# Patient Record
Sex: MALE | Race: WHITE | NOT HISPANIC OR LATINO | Employment: FULL TIME | URBAN - METROPOLITAN AREA
[De-identification: names, ages, dates, MRNs, and addresses within clinical notes are randomized per-mention and may not be internally consistent; named-entity substitution may affect disease eponyms.]

---

## 2021-05-25 ENCOUNTER — RECORDS - HEALTHEAST (OUTPATIENT)
Dept: ADMINISTRATIVE | Facility: CLINIC | Age: 61
End: 2021-05-25

## 2022-09-07 ENCOUNTER — APPOINTMENT (OUTPATIENT)
Dept: GENERAL RADIOLOGY | Facility: CLINIC | Age: 62
End: 2022-09-07
Attending: EMERGENCY MEDICINE
Payer: OTHER MISCELLANEOUS

## 2022-09-07 ENCOUNTER — HOSPITAL ENCOUNTER (EMERGENCY)
Facility: CLINIC | Age: 62
Discharge: HOME | End: 2022-09-07
Attending: EMERGENCY MEDICINE | Admitting: EMERGENCY MEDICINE
Payer: OTHER MISCELLANEOUS

## 2022-09-07 VITALS
TEMPERATURE: 98 F | OXYGEN SATURATION: 96 % | RESPIRATION RATE: 12 BRPM | DIASTOLIC BLOOD PRESSURE: 64 MMHG | SYSTOLIC BLOOD PRESSURE: 117 MMHG

## 2022-09-07 DIAGNOSIS — T67.5XXA HEAT EXHAUSTION, INITIAL ENCOUNTER: ICD-10-CM

## 2022-09-07 DIAGNOSIS — R06.02 SOB (SHORTNESS OF BREATH): ICD-10-CM

## 2022-09-07 LAB
ANION GAP SERPL CALCULATED.3IONS-SCNC: 5 MMOL/L (ref 3–14)
ATRIAL RATE - MUSE: 63 BPM
BASOPHILS # BLD AUTO: 0 10E3/UL (ref 0–0.2)
BASOPHILS NFR BLD AUTO: 0 %
BUN SERPL-MCNC: 30 MG/DL (ref 7–30)
CALCIUM SERPL-MCNC: 8.1 MG/DL (ref 8.5–10.1)
CHLORIDE BLD-SCNC: 112 MMOL/L (ref 94–109)
CO2 SERPL-SCNC: 24 MMOL/L (ref 20–32)
CREAT SERPL-MCNC: 0.96 MG/DL (ref 0.66–1.25)
D DIMER PPP FEU-MCNC: <0.27 UG/ML FEU (ref 0–0.5)
DIASTOLIC BLOOD PRESSURE - MUSE: NORMAL MMHG
EOSINOPHIL # BLD AUTO: 0.1 10E3/UL (ref 0–0.7)
EOSINOPHIL NFR BLD AUTO: 1 %
ERYTHROCYTE [DISTWIDTH] IN BLOOD BY AUTOMATED COUNT: 13.7 % (ref 10–15)
FLUAV RNA SPEC QL NAA+PROBE: NEGATIVE
FLUBV RNA RESP QL NAA+PROBE: NEGATIVE
GFR SERPL CREATININE-BSD FRML MDRD: 90 ML/MIN/1.73M2
GLUCOSE BLD-MCNC: 103 MG/DL (ref 70–99)
HCT VFR BLD AUTO: 36.8 % (ref 40–53)
HGB BLD-MCNC: 11.8 G/DL (ref 13.3–17.7)
IMM GRANULOCYTES # BLD: 0 10E3/UL
IMM GRANULOCYTES NFR BLD: 0 %
INTERPRETATION ECG - MUSE: NORMAL
LYMPHOCYTES # BLD AUTO: 1.4 10E3/UL (ref 0.8–5.3)
LYMPHOCYTES NFR BLD AUTO: 19 %
MCH RBC QN AUTO: 27.2 PG (ref 26.5–33)
MCHC RBC AUTO-ENTMCNC: 32.1 G/DL (ref 31.5–36.5)
MCV RBC AUTO: 85 FL (ref 78–100)
MONOCYTES # BLD AUTO: 0.7 10E3/UL (ref 0–1.3)
MONOCYTES NFR BLD AUTO: 10 %
NEUTROPHILS # BLD AUTO: 5 10E3/UL (ref 1.6–8.3)
NEUTROPHILS NFR BLD AUTO: 70 %
NRBC # BLD AUTO: 0 10E3/UL
NRBC BLD AUTO-RTO: 0 /100
P AXIS - MUSE: 33 DEGREES
PLATELET # BLD AUTO: 271 10E3/UL (ref 150–450)
POTASSIUM BLD-SCNC: 3.8 MMOL/L (ref 3.4–5.3)
PR INTERVAL - MUSE: 178 MS
QRS DURATION - MUSE: 104 MS
QT - MUSE: 458 MS
QTC - MUSE: 468 MS
R AXIS - MUSE: -23 DEGREES
RBC # BLD AUTO: 4.34 10E6/UL (ref 4.4–5.9)
RSV RNA SPEC NAA+PROBE: NEGATIVE
SARS-COV-2 RNA RESP QL NAA+PROBE: NEGATIVE
SODIUM SERPL-SCNC: 141 MMOL/L (ref 133–144)
SYSTOLIC BLOOD PRESSURE - MUSE: NORMAL MMHG
T AXIS - MUSE: 13 DEGREES
TROPONIN I SERPL HS-MCNC: 5 NG/L
VENTRICULAR RATE- MUSE: 63 BPM
WBC # BLD AUTO: 7.1 10E3/UL (ref 4–11)

## 2022-09-07 PROCEDURE — 71046 X-RAY EXAM CHEST 2 VIEWS: CPT

## 2022-09-07 PROCEDURE — C9803 HOPD COVID-19 SPEC COLLECT: HCPCS

## 2022-09-07 PROCEDURE — 80048 BASIC METABOLIC PNL TOTAL CA: CPT | Performed by: EMERGENCY MEDICINE

## 2022-09-07 PROCEDURE — 36415 COLL VENOUS BLD VENIPUNCTURE: CPT | Performed by: EMERGENCY MEDICINE

## 2022-09-07 PROCEDURE — 87637 SARSCOV2&INF A&B&RSV AMP PRB: CPT | Performed by: EMERGENCY MEDICINE

## 2022-09-07 PROCEDURE — 93005 ELECTROCARDIOGRAM TRACING: CPT

## 2022-09-07 PROCEDURE — 258N000003 HC RX IP 258 OP 636: Performed by: EMERGENCY MEDICINE

## 2022-09-07 PROCEDURE — 99285 EMERGENCY DEPT VISIT HI MDM: CPT | Mod: CS,25

## 2022-09-07 PROCEDURE — 84484 ASSAY OF TROPONIN QUANT: CPT | Performed by: EMERGENCY MEDICINE

## 2022-09-07 PROCEDURE — 85025 COMPLETE CBC W/AUTO DIFF WBC: CPT | Performed by: EMERGENCY MEDICINE

## 2022-09-07 PROCEDURE — 85379 FIBRIN DEGRADATION QUANT: CPT | Performed by: EMERGENCY MEDICINE

## 2022-09-07 RX ADMIN — SODIUM CHLORIDE 1000 ML: 9 INJECTION, SOLUTION INTRAVENOUS at 15:17

## 2022-09-07 ASSESSMENT — ENCOUNTER SYMPTOMS
DIZZINESS: 0
LIGHT-HEADEDNESS: 0
BACK PAIN: 0
SHORTNESS OF BREATH: 1
FEVER: 0
ABDOMINAL PAIN: 0
COUGH: 1
BLOOD IN STOOL: 0

## 2022-09-07 ASSESSMENT — ACTIVITIES OF DAILY LIVING (ADL): ADLS_ACUITY_SCORE: 35

## 2022-09-07 NOTE — ED TRIAGE NOTES
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 18 gauge in left arm.  NO other meds by EMS, NP gave 325 of ASA just in case, patient declined any CP, did stated that her felt weak and unable to take a deep breath at one point of time,  VSS here in the ER.  Running a liter.  (liter bag from EMS was very warm)     Triage Assessment     Row Name 09/07/22 1321       Triage Assessment (Adult)    Airway WDL WDL       Respiratory WDL    Respiratory WDL WDL       Skin Circulation/Temperature WDL    Skin Circulation/Temperature WDL WDL       Cardiac WDL    Cardiac WDL WDL       Peripheral/Neurovascular WDL    Peripheral Neurovascular WDL WDL       Cognitive/Neuro/Behavioral WDL    Cognitive/Neuro/Behavioral WDL WDL

## 2022-09-07 NOTE — ED PROVIDER NOTES
History   Chief Complaint:  Shortness of breath     The history is provided by the patient.      Felton Leong is a 61 year old male with a history of sleep apnea who presents via EMS with shortness of breath. 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 He states that the shortness of breath has been persistent since. He denies breathing in any chemicals stating that he was wearing a respirator. He endorses a cough for approximately the past two weeks that is now resolving. He denies any chest pain, abdominal pain, back pain, fevers, blood in his stool, swelling in his legs, lightheadedness, dizziness, or feeling near syncope. He has not been around anyone sick. No history of blood clots or anemia. He denies any significant health issues or taking any daily medications. He states that he used to smoke tobacco, but he quit 20 years ago. He lives alone and does not normally wear oxygen.    Review of Systems   Constitutional: Negative for fever.   Respiratory: Positive for cough and shortness of breath.    Cardiovascular: Negative for chest pain and leg swelling.   Gastrointestinal: Negative for abdominal pain and blood in stool.   Musculoskeletal: Negative for back pain.   Neurological: Negative for dizziness and light-headedness.   All other systems reviewed and are negative.    Allergies:  The patient has no known allergies.     Medications:  The patient is currently on no regular medications.    Past Medical History:     Sleep apnea    Social History:  The patient presents to the ED unaccompanied  Presents via EMS  Living Situation: lives alone   Tobacco Use: former smoker (quit 20 years ago)  Occupation: battery recycling      Physical Exam     Patient Vitals for the past 24 hrs:   BP Temp Temp src Resp SpO2   09/07/22 1400 -- -- -- -- 96 %   09/07/22 1324 117/64 98  F (36.7  C) Oral  12 94 %       Physical Exam  General: Alert and cooperative with exam. Patient in mild distress. Overweight. Normal mentation.  Head:  Scalp is NC/AT  Eyes:  No scleral icterus, PERRL  ENT:  The external nose and ears are normal. The oropharynx is normal and without erythema; mucus membranes are moist. Uvula midline, no evidence of deep space infection.  Neck:  Normal range of motion without rigidity.  CV:  Regular rate and rhythm    No pathologic murmur   Resp:  Breath sounds are clear bilaterally    Non-labored, no retractions or accessory muscle use  GI:  Abdomen is soft, no distension, no tenderness. No peritoneal signs  MS:  No lower extremity edema   Skin:  Warm and dry, No rash or lesions noted.  Neuro: Oriented x 3. No gross motor deficits.    Emergency Department Course   ECG:  ECG taken at 1449, ECG read at 1545  Sinus rhythm with premature atrial complexes. Otherwise normal ECG.   No prior ECG for comparison.  Rate 63 bpm. SC interval 178. QRS duration 104. QT/QTc 458/468. P-R-T axes 33 -23 13.    Imaging:  Chest XR,  PA & LAT   Final Result   IMPRESSION: There are no acute infiltrates. The cardiac silhouette is   not enlarged. Pulmonary vasculature is unremarkable.      RINA DARBY MD            SYSTEM ID:  [id number]        Report per radiology    Laboratory:  Labs Ordered and Resulted from Time of ED Arrival to Time of ED Departure   BASIC METABOLIC PANEL - Abnormal       Result Value    Sodium 141      Potassium 3.8      Chloride 112 (*)     Carbon Dioxide (CO2) 24      Anion Gap 5      Urea Nitrogen 30      Creatinine 0.96      Calcium 8.1 (*)     Glucose 103 (*)     GFR Estimate 90     CBC WITH PLATELETS AND DIFFERENTIAL - Abnormal    WBC Count 7.1      RBC Count 4.34 (*)     Hemoglobin 11.8 (*)     Hematocrit 36.8 (*)     MCV 85      MCH 27.2      MCHC 32.1      RDW 13.7      Platelet Count 271      % Neutrophils 70      % Lymphocytes 19      % Monocytes 10      % Eosinophils 1      % Basophils 0       % Immature Granulocytes 0      NRBCs per 100 WBC 0      Absolute Neutrophils 5.0      Absolute Lymphocytes 1.4      Absolute Monocytes 0.7      Absolute Eosinophils 0.1      Absolute Basophils 0.0      Absolute Immature Granulocytes 0.0      Absolute NRBCs 0.0     D DIMER QUANTITATIVE - Normal    D-Dimer Quantitative <0.27     TROPONIN I - Normal    Troponin I High Sensitivity 5     INFLUENZA A/B & SARS-COV2 PCR MULTIPLEX - Normal    Influenza A PCR Negative      Influenza B PCR Negative      RSV PCR Negative      SARS CoV2 PCR Negative          Emergency Department Course:           Reviewed:  I reviewed nursing notes, vitals, past medical history and Care Everywhere    Assessments:  1349 I obtained history and examined the patient as noted above.   1510 I rechecked the patient and explained findings.     Interventions:  1517 NS 1L IV    Disposition:  The patient was discharged to home.     Impression & Plan     Medical Decision Making:  Patient is a 61-year-old male with history of sleep apnea who presents with episode of shortness of breath and dizziness/near syncope that occurred just prior to arrival while working in a hot environment.  Patient's medical history and records reviewed.  On evaluation patient is asymptomatic, well-appearing, with normal vital signs.  EKG, chest x-ray, and labs are essentially unremarkable as noted above.  Troponin and D-dimer are both negative; ACS and PE felt to be unlikely.  Patient is without infectious symptoms.  Presentation is most consistent with heat exhaustion and potential vasovagal episode.  He did receive IV fluids.  While in the ED patient was noted to have mild oxygen desaturation when sleeping and has known sleep apnea; patient notes that his current CPAP machine is nonfunctional and I encouraged him to obtain a new machine as soon as possible.  At this time I feel patient is safe and appropriate for continued outpatient management.  Supportive care return  precautions discussed.  Patient discharged home.    Covid-19  Felton Leong was evaluated during a global COVID-19 pandemic, which necessitated consideration that the patient might be at risk for infection with the SARS-CoV-2 virus that causes COVID-19.   Applicable protocols for evaluation were followed during the patient's care.   COVID-19 was considered as part of the patient's evaluation. The plan for testing is:  a test was obtained during this visit.    Diagnosis:    ICD-10-CM    1. SOB (shortness of breath)  R06.02    2. Heat exhaustion, initial encounter  T67.5XXA        Scribe Disclosure:  I, Lauren Car, am serving as a scribe at 1:42 PM on 9/7/2022 to document services personally performed by Jonny Loya DO based on my observations and the provider's statements to me.            Jonny Loya DO  09/07/22 2158

## 2022-09-07 NOTE — ED NOTES
Bed: ED20  Expected date:   Expected time:   Means of arrival:   Comments:  [facility] - 61 M SOB eta 1315

## 2024-01-23 ENCOUNTER — LAB REQUISITION (OUTPATIENT)
Dept: LAB | Facility: CLINIC | Age: 64
End: 2024-01-23
Payer: COMMERCIAL

## 2024-01-23 DIAGNOSIS — Z13.1 ENCOUNTER FOR SCREENING FOR DIABETES MELLITUS: ICD-10-CM

## 2024-01-23 DIAGNOSIS — Z12.5 ENCOUNTER FOR SCREENING FOR MALIGNANT NEOPLASM OF PROSTATE: ICD-10-CM

## 2024-01-23 DIAGNOSIS — Z13.6 ENCOUNTER FOR SCREENING FOR CARDIOVASCULAR DISORDERS: ICD-10-CM

## 2024-01-23 PROCEDURE — 80048 BASIC METABOLIC PNL TOTAL CA: CPT | Mod: ORL | Performed by: FAMILY MEDICINE

## 2024-01-23 PROCEDURE — G0103 PSA SCREENING: HCPCS | Mod: ORL | Performed by: FAMILY MEDICINE

## 2024-01-23 PROCEDURE — 80061 LIPID PANEL: CPT | Mod: ORL | Performed by: FAMILY MEDICINE

## 2024-01-24 LAB
ANION GAP SERPL CALCULATED.3IONS-SCNC: 11 MMOL/L (ref 7–15)
BUN SERPL-MCNC: 20.6 MG/DL (ref 8–23)
CALCIUM SERPL-MCNC: 8.7 MG/DL (ref 8.8–10.2)
CHLORIDE SERPL-SCNC: 104 MMOL/L (ref 98–107)
CHOLEST SERPL-MCNC: 176 MG/DL
CREAT SERPL-MCNC: 0.85 MG/DL (ref 0.67–1.17)
DEPRECATED HCO3 PLAS-SCNC: 23 MMOL/L (ref 22–29)
EGFRCR SERPLBLD CKD-EPI 2021: >90 ML/MIN/1.73M2
FASTING STATUS PATIENT QL REPORTED: ABNORMAL
GLUCOSE SERPL-MCNC: 98 MG/DL (ref 70–99)
HDLC SERPL-MCNC: 42 MG/DL
LDLC SERPL CALC-MCNC: 100 MG/DL
NONHDLC SERPL-MCNC: 134 MG/DL
POTASSIUM SERPL-SCNC: 4 MMOL/L (ref 3.4–5.3)
PSA SERPL DL<=0.01 NG/ML-MCNC: 1.14 NG/ML (ref 0–4.5)
SODIUM SERPL-SCNC: 138 MMOL/L (ref 135–145)
TRIGL SERPL-MCNC: 169 MG/DL

## 2024-06-03 ENCOUNTER — TRANSFERRED RECORDS (OUTPATIENT)
Dept: HEALTH INFORMATION MANAGEMENT | Facility: CLINIC | Age: 64
End: 2024-06-03
Payer: COMMERCIAL

## 2024-06-10 ENCOUNTER — MEDICAL CORRESPONDENCE (OUTPATIENT)
Dept: HEALTH INFORMATION MANAGEMENT | Facility: CLINIC | Age: 64
End: 2024-06-10
Payer: COMMERCIAL

## 2024-06-11 ENCOUNTER — TRANSCRIBE ORDERS (OUTPATIENT)
Dept: OTHER | Age: 64
End: 2024-06-11

## 2024-06-11 DIAGNOSIS — R05.3 CHRONIC COUGH: Primary | ICD-10-CM

## 2024-06-13 RX ORDER — ALBUTEROL SULFATE 0.83 MG/ML
2.5 SOLUTION RESPIRATORY (INHALATION) ONCE
Status: COMPLETED | OUTPATIENT
Start: 2024-06-14 | End: 2024-06-14

## 2024-06-14 ENCOUNTER — HOSPITAL ENCOUNTER (OUTPATIENT)
Dept: RESPIRATORY THERAPY | Facility: CLINIC | Age: 64
Discharge: HOME | End: 2024-06-14
Admitting: NURSE PRACTITIONER
Payer: COMMERCIAL

## 2024-06-14 VITALS
HEART RATE: 65 BPM | OXYGEN SATURATION: 94 % | SYSTOLIC BLOOD PRESSURE: 126 MMHG | DIASTOLIC BLOOD PRESSURE: 67 MMHG | RESPIRATION RATE: 18 BRPM

## 2024-06-14 DIAGNOSIS — R05.3 CHRONIC COUGH: ICD-10-CM

## 2024-06-14 PROCEDURE — 94070 EVALUATION OF WHEEZING: CPT

## 2024-06-14 PROCEDURE — 95070 INHLJ BRNCL CHALLENGE TSTG: CPT

## 2024-06-14 PROCEDURE — 999N000157 HC STATISTIC RCP TIME EA 10 MIN

## 2024-06-14 PROCEDURE — 94070 EVALUATION OF WHEEZING: CPT | Mod: 26 | Performed by: INTERNAL MEDICINE

## 2024-06-14 PROCEDURE — 250N000009 HC RX 250

## 2024-06-14 RX ADMIN — ALBUTEROL SULFATE 2.5 MG: 2.5 SOLUTION RESPIRATORY (INHALATION) at 07:40

## 2024-06-14 NOTE — PROGRESS NOTES
Methacholine Challenge Complete  Pt performed PRE FVC then given all 5 doses of Methacholine with FVC in between.  Pt mary ellen well  Pt given Alb neb then performed POST FVC.  VS in EPIC, Pt left in no distress    Adelaida Raphael, RT

## 2024-06-17 LAB
EXPTIME-PRE: 6.92 SEC
FEF2575-%PRED-POST: 116 %
FEF2575-%PRED-PRE: 126 %
FEF2575-POST: 3.09 L/SEC
FEF2575-PRE: 3.35 L/SEC
FEF2575-PRED: 2.65 L/SEC
FEFMAX-%PRED-PRE: 59 %
FEFMAX-PRE: 5.35 L/SEC
FEFMAX-PRED: 8.95 L/SEC
FEV1-%PRED-PRE: 97 %
FEV1-PRE: 3.14 L
FEV1FEV6-PRE: 82 %
FEV1FEV6-PRED: 79 %
FEV1FVC-PRE: 81 %
FEV1FVC-PRED: 78 %
FIFMAX-PRE: 6.39 L/SEC
FVC-%PRED-PRE: 93 %
FVC-PRE: 3.88 L
FVC-PRED: 4.15 L

## 2024-06-27 ENCOUNTER — APPOINTMENT (OUTPATIENT)
Dept: CT IMAGING | Facility: HOSPITAL | Age: 64
End: 2024-06-27
Attending: EMERGENCY MEDICINE
Payer: COMMERCIAL

## 2024-06-27 ENCOUNTER — HOSPITAL ENCOUNTER (EMERGENCY)
Facility: HOSPITAL | Age: 64
Discharge: HOME | End: 2024-06-27
Attending: EMERGENCY MEDICINE | Admitting: EMERGENCY MEDICINE
Payer: COMMERCIAL

## 2024-06-27 VITALS
RESPIRATION RATE: 22 BRPM | SYSTOLIC BLOOD PRESSURE: 151 MMHG | DIASTOLIC BLOOD PRESSURE: 87 MMHG | WEIGHT: 279 LBS | BODY MASS INDEX: 39.94 KG/M2 | HEART RATE: 61 BPM | HEIGHT: 70 IN | OXYGEN SATURATION: 96 % | TEMPERATURE: 98.6 F

## 2024-06-27 DIAGNOSIS — R10.31 RIGHT LOWER QUADRANT ABDOMINAL PAIN: ICD-10-CM

## 2024-06-27 LAB
ALBUMIN SERPL BCG-MCNC: 4.1 G/DL (ref 3.5–5.2)
ALBUMIN UR-MCNC: NEGATIVE MG/DL
ALP SERPL-CCNC: 69 U/L (ref 40–150)
ALT SERPL W P-5'-P-CCNC: 23 U/L (ref 0–70)
ANION GAP SERPL CALCULATED.3IONS-SCNC: 10 MMOL/L (ref 7–15)
APPEARANCE UR: CLEAR
AST SERPL W P-5'-P-CCNC: 25 U/L (ref 0–45)
BASOPHILS # BLD AUTO: 0 10E3/UL (ref 0–0.2)
BASOPHILS NFR BLD AUTO: 0 %
BILIRUB SERPL-MCNC: 0.5 MG/DL
BILIRUB UR QL STRIP: NEGATIVE
BUN SERPL-MCNC: 21.2 MG/DL (ref 8–23)
CALCIUM SERPL-MCNC: 8.5 MG/DL (ref 8.8–10.2)
CHLORIDE SERPL-SCNC: 104 MMOL/L (ref 98–107)
COLOR UR AUTO: ABNORMAL
CREAT SERPL-MCNC: 0.94 MG/DL (ref 0.67–1.17)
DEPRECATED HCO3 PLAS-SCNC: 25 MMOL/L (ref 22–29)
EGFRCR SERPLBLD CKD-EPI 2021: >90 ML/MIN/1.73M2
EOSINOPHIL # BLD AUTO: 0.1 10E3/UL (ref 0–0.7)
EOSINOPHIL NFR BLD AUTO: 1 %
ERYTHROCYTE [DISTWIDTH] IN BLOOD BY AUTOMATED COUNT: 13 % (ref 10–15)
GLUCOSE SERPL-MCNC: 94 MG/DL (ref 70–99)
GLUCOSE UR STRIP-MCNC: NEGATIVE MG/DL
HCT VFR BLD AUTO: 41.3 % (ref 40–53)
HGB BLD-MCNC: 13.5 G/DL (ref 13.3–17.7)
HGB UR QL STRIP: ABNORMAL
HOLD SPECIMEN: NORMAL
IMM GRANULOCYTES # BLD: 0 10E3/UL
IMM GRANULOCYTES NFR BLD: 0 %
KETONES UR STRIP-MCNC: ABNORMAL MG/DL
LEUKOCYTE ESTERASE UR QL STRIP: NEGATIVE
LYMPHOCYTES # BLD AUTO: 2.2 10E3/UL (ref 0.8–5.3)
LYMPHOCYTES NFR BLD AUTO: 31 %
MCH RBC QN AUTO: 28 PG (ref 26.5–33)
MCHC RBC AUTO-ENTMCNC: 32.7 G/DL (ref 31.5–36.5)
MCV RBC AUTO: 86 FL (ref 78–100)
MONOCYTES # BLD AUTO: 0.6 10E3/UL (ref 0–1.3)
MONOCYTES NFR BLD AUTO: 9 %
MUCOUS THREADS #/AREA URNS LPF: PRESENT /LPF
NEUTROPHILS # BLD AUTO: 4 10E3/UL (ref 1.6–8.3)
NEUTROPHILS NFR BLD AUTO: 58 %
NITRATE UR QL: NEGATIVE
NRBC # BLD AUTO: 0 10E3/UL
NRBC BLD AUTO-RTO: 0 /100
PH UR STRIP: 5.5 [PH] (ref 5–7)
PLATELET # BLD AUTO: 263 10E3/UL (ref 150–450)
POTASSIUM SERPL-SCNC: 4.2 MMOL/L (ref 3.4–5.3)
PROT SERPL-MCNC: 7.2 G/DL (ref 6.4–8.3)
RBC # BLD AUTO: 4.82 10E6/UL (ref 4.4–5.9)
RBC URINE: 3 /HPF
SODIUM SERPL-SCNC: 139 MMOL/L (ref 135–145)
SP GR UR STRIP: 1.03 (ref 1–1.03)
UROBILINOGEN UR STRIP-MCNC: <2 MG/DL
WBC # BLD AUTO: 6.9 10E3/UL (ref 4–11)
WBC URINE: 0 /HPF

## 2024-06-27 PROCEDURE — 81001 URINALYSIS AUTO W/SCOPE: CPT | Performed by: EMERGENCY MEDICINE

## 2024-06-27 PROCEDURE — 36415 COLL VENOUS BLD VENIPUNCTURE: CPT | Performed by: STUDENT IN AN ORGANIZED HEALTH CARE EDUCATION/TRAINING PROGRAM

## 2024-06-27 PROCEDURE — 74177 CT ABD & PELVIS W/CONTRAST: CPT

## 2024-06-27 PROCEDURE — 85041 AUTOMATED RBC COUNT: CPT | Performed by: EMERGENCY MEDICINE

## 2024-06-27 PROCEDURE — 250N000011 HC RX IP 250 OP 636: Performed by: EMERGENCY MEDICINE

## 2024-06-27 PROCEDURE — 80053 COMPREHEN METABOLIC PANEL: CPT | Performed by: EMERGENCY MEDICINE

## 2024-06-27 PROCEDURE — 99285 EMERGENCY DEPT VISIT HI MDM: CPT | Mod: 25

## 2024-06-27 RX ORDER — IOPAMIDOL 755 MG/ML
90 INJECTION, SOLUTION INTRAVASCULAR ONCE
Status: COMPLETED | OUTPATIENT
Start: 2024-06-27 | End: 2024-06-27

## 2024-06-27 RX ADMIN — IOPAMIDOL 90 ML: 755 INJECTION, SOLUTION INTRAVENOUS at 13:29

## 2024-06-27 ASSESSMENT — ACTIVITIES OF DAILY LIVING (ADL)
ADLS_ACUITY_SCORE: 35

## 2024-06-27 ASSESSMENT — ENCOUNTER SYMPTOMS
SHORTNESS OF BREATH: 0
DYSURIA: 0
DIFFICULTY URINATING: 0
BACK PAIN: 0
CHILLS: 0
DIARRHEA: 0
VOMITING: 0
ABDOMINAL PAIN: 1
CONSTIPATION: 0
FEVER: 0
NAUSEA: 0

## 2024-06-27 ASSESSMENT — COLUMBIA-SUICIDE SEVERITY RATING SCALE - C-SSRS
2. HAVE YOU ACTUALLY HAD ANY THOUGHTS OF KILLING YOURSELF IN THE PAST MONTH?: NO
1. IN THE PAST MONTH, HAVE YOU WISHED YOU WERE DEAD OR WISHED YOU COULD GO TO SLEEP AND NOT WAKE UP?: NO
6. HAVE YOU EVER DONE ANYTHING, STARTED TO DO ANYTHING, OR PREPARED TO DO ANYTHING TO END YOUR LIFE?: NO

## 2024-06-27 NOTE — DISCHARGE INSTRUCTIONS
The abdominal CT scan, urinalysis, and all laboratory test appear reassuring here today in the emergency department.  The underlying cause of your pain remains unclear at this time.  It is possible that your symptoms are due to muscle spasms.  However, it is also still possible that your symptoms are due to an early appendicitis that did not show up today on the labs or imaging studies.  You can use over-the-counter ibuprofen as needed for any further pain.  If your pain worsens over the next 24 hours or if you develop any vomiting, fevers, or any other new or concerning symptoms please return back to the ED for reevaluation

## 2024-06-27 NOTE — ED PROVIDER NOTES
EMERGENCY DEPARTMENT ENCOUNTER      NAME: Felton Leong  AGE: 63 year old male  YOB: 1960  MRN: [id number]  EVALUATION DATE & TIME: 2024 11:22 AM    PCP: No Ref-Primary, Physician    ED PROVIDER: Sarah Mccormack DO      Chief Complaint   Patient presents with    Abdominal Pain         FINAL IMPRESSION:  1. Right lower quadrant abdominal pain          ED COURSE & MEDICAL DECISION MAKIN-year-old male presented to the ED for evaluation of right lower quadrant abdominal pain that began earlier this morning.  The patient had no other associated symptoms.  Upon arrival to the ED the patient was still hypertensive.  He was otherwise hemodynamic stable.  The patient did not appear to be in any obvious distress or discomfort.  On exam he was noted to have mild tenderness to palpation noted in the right lower quadrant.  He did not have evidence of acute abdomen, however.  The remainder of his physical exam was unremarkable.    Following his initial evaluation the patient declined anything for pain control.    CBC, CMP, and UA were all reassuring.    CT scan of the abdomen was nondiagnostic.      The patient was reevaluated and informed of the reassuring lab, UA, and abdominal CT scan results.  The underlying cause for the patient's acute on chronic right lower quadrant pain remains unclear.  The patient was informed that his symptoms may represent cramps/spasms.  He was also informed that his symptoms could represent an early appendicitis that has not yet shown up with the imaging or laboratory testing.  The patient was instructed to use over-the-counter ibuprofen as needed for any further pain.  He was instructed to return back to the ED within 24 hours if the pain persists or worsens or if he develops any vomiting, fevers, or any other new or concerning symptoms.    Pertinent Labs & Imaging studies reviewed. (See chart for details)  11:30 I met with the patient to gather history and to perform my  initial exam. We discussed plans for the ED course, including diagnostic testing and treatment.       At the conclusion of the encounter I discussed the results of all of the tests and the disposition. The questions were answered. The patient or family acknowledged understanding and was agreeable with the care plan.     Medical Decision Making    History:  Supplemental history from: Documented in chart and Family Member/Significant Other  External Record(s) reviewed: Documented in chart    Work Up:  Chart documentation includes differential considered and any EKGs or imaging independently interpreted by provider, where specified.  In additional to work up documented, I considered the following work up: Documented in chart, if applicable.    External consultation:  Discussion of management with another provider: Documented in chart, if applicable    Complicating factors:  Care impacted by chronic illness: N/A  Care affected by social determinants of health: N/A    Disposition considerations: Discharge. No recommendations on prescription strength medication(s). See documentation for any additional details.      PPE worn: n95 mask, goggles    MEDICATIONS GIVEN IN THE EMERGENCY:  Medications   iopamidol (ISOVUE-370) solution 90 mL (90 mLs Intravenous $Given 6/27/24 5504)       NEW PRESCRIPTIONS STARTED AT TODAY'S ER VISIT  There are no discharge medications for this patient.         =================================================================    HPI    Patient information was obtained from: Patient    Use of : N/A       Felton Leong is a 63 year old male with no pertinent history on file who presents to this ED via private car for evaluation of abdominal pain.    The patient reports right lower quadrant abdominal pain that began earlier this morning and has since worsened. The pain does not radiate elsewhere. He states that he has had this pain for a few years, but it has never been this severe before.  "Notes a history of kidney stones, but states that the pain does not feel similar. No previous abdominal surgeries. He has not taken any pain medication before.     Denies nausea, vomiting, fever, chills, back pain, chest pain, shortness of breath, urinary or bowel changes, or any other complaints at this time.    REVIEW OF SYSTEMS   Review of Systems   Constitutional:  Negative for chills and fever.   Respiratory:  Negative for shortness of breath.    Cardiovascular:  Negative for chest pain.   Gastrointestinal:  Positive for abdominal pain (RLQ). Negative for constipation, diarrhea, nausea and vomiting.   Genitourinary:  Negative for difficulty urinating and dysuria.   Musculoskeletal:  Negative for back pain.   All other systems reviewed and are negative.       PAST MEDICAL HISTORY:  History reviewed. No pertinent past medical history.    PAST SURGICAL HISTORY:  History reviewed. No pertinent surgical history.        CURRENT MEDICATIONS:    No current outpatient medications on file.      ALLERGIES:  No Known Allergies    FAMILY HISTORY:  History reviewed. No pertinent family history.    SOCIAL HISTORY:   Social History     Socioeconomic History    Marital status:      Spouse name: None    Number of children: None    Years of education: None    Highest education level: None       VITALS:  BP (!) 151/87   Pulse 61   Temp 98.6  F (37  C) (Oral)   Resp 22   Ht 1.778 m (5' 10\")   Wt 126.6 kg (279 lb)   SpO2 96%   BMI 40.03 kg/m      PHYSICAL EXAM    General presentation: Alert, Vital signs reviewed. NAD  HENT: ENT inspection is normal. Oropharynx is moist and clear.   Eye: Pupils are equal and reactive to light. EOMI  Neck: The neck is supple, with full ROM, with no evidence of meningismus.  Pulmonary: Currently in no acute respiratory distress. Normal, non labored respirations, the lung sounds are normal with good equal air movement. Clear to auscultation bilaterally.   Circulatory: Regular rate and " rhythm. Peripheral pulses are strong and equal. No murmurs, rubs, or gallops.   Abdominal: The abdomen is soft. No rigidity, guarding, or rebound. Bowel sounds normal. Mild tenderness to palpation to the right lower quadrant.  Neurologic: Alert, oriented to person, place, and time. No motor deficit. No sensory deficit. Cranial nerves II through XII are intact.  Musculoskeletal: No extremity tenderness. Full range of motion in all extremities. No extremity edema.   Skin: Skin color is normal. No rash. Warm. Dry to touch.      LAB:  All pertinent labs reviewed and interpreted.  Results for orders placed or performed during the hospital encounter of 06/27/24   CT Abdomen Pelvis w Contrast    Impression    IMPRESSION:     1.  No appendicitis or other acute abnormality.    2.  No specific cause of abdominal pain is identified.    Extra Blue Top Tube   Result Value Ref Range    Hold Specimen JIC    Extra Red Top Tube   Result Value Ref Range    Hold Specimen JIC    Extra Green Top (Lithium Heparin) Tube   Result Value Ref Range    Hold Specimen JIC    Extra Purple Top Tube   Result Value Ref Range    Hold Specimen JIC    Extra Green Top (Lithium Heparin) ON ICE   Result Value Ref Range    Hold Specimen JIC    Comprehensive metabolic panel   Result Value Ref Range    Sodium 139 135 - 145 mmol/L    Potassium 4.2 3.4 - 5.3 mmol/L    Carbon Dioxide (CO2) 25 22 - 29 mmol/L    Anion Gap 10 7 - 15 mmol/L    Urea Nitrogen 21.2 8.0 - 23.0 mg/dL    Creatinine 0.94 0.67 - 1.17 mg/dL    GFR Estimate >90 >60 mL/min/1.73m2    Calcium 8.5 (L) 8.8 - 10.2 mg/dL    Chloride 104 98 - 107 mmol/L    Glucose 94 70 - 99 mg/dL    Alkaline Phosphatase 69 40 - 150 U/L    AST 25 0 - 45 U/L    ALT 23 0 - 70 U/L    Protein Total 7.2 6.4 - 8.3 g/dL    Albumin 4.1 3.5 - 5.2 g/dL    Bilirubin Total 0.5 <=1.2 mg/dL   UA with Microscopic reflex to Culture    Specimen: Urine, Clean Catch   Result Value Ref Range    Color Urine Light Yellow Colorless,  Straw, Light Yellow, Yellow    Appearance Urine Clear Clear    Glucose Urine Negative Negative mg/dL    Bilirubin Urine Negative Negative    Ketones Urine Trace (A) Negative mg/dL    Specific Gravity Urine 1.027 1.001 - 1.030    Blood Urine 0.1 mg/dL (A) Negative    pH Urine 5.5 5.0 - 7.0    Protein Albumin Urine Negative Negative mg/dL    Urobilinogen Urine <2.0 <2.0 mg/dL    Nitrite Urine Negative Negative    Leukocyte Esterase Urine Negative Negative    Mucus Urine Present (A) None Seen /LPF    RBC Urine 3 (H) <=2 /HPF    WBC Urine 0 <=5 /HPF   CBC with platelets and differential   Result Value Ref Range    WBC Count 6.9 4.0 - 11.0 10e3/uL    RBC Count 4.82 4.40 - 5.90 10e6/uL    Hemoglobin 13.5 13.3 - 17.7 g/dL    Hematocrit 41.3 40.0 - 53.0 %    MCV 86 78 - 100 fL    MCH 28.0 26.5 - 33.0 pg    MCHC 32.7 31.5 - 36.5 g/dL    RDW 13.0 10.0 - 15.0 %    Platelet Count 263 150 - 450 10e3/uL    % Neutrophils 58 %    % Lymphocytes 31 %    % Monocytes 9 %    % Eosinophils 1 %    % Basophils 0 %    % Immature Granulocytes 0 %    NRBCs per 100 WBC 0 <1 /100    Absolute Neutrophils 4.0 1.6 - 8.3 10e3/uL    Absolute Lymphocytes 2.2 0.8 - 5.3 10e3/uL    Absolute Monocytes 0.6 0.0 - 1.3 10e3/uL    Absolute Eosinophils 0.1 0.0 - 0.7 10e3/uL    Absolute Basophils 0.0 0.0 - 0.2 10e3/uL    Absolute Immature Granulocytes 0.0 <=0.4 10e3/uL    Absolute NRBCs 0.0 10e3/uL       RADIOLOGY:  Reviewed all pertinent imaging. Please see official radiology report.  CT Abdomen Pelvis w Contrast   Final Result   IMPRESSION:       1.  No appendicitis or other acute abnormality.      2.  No specific cause of abdominal pain is identified.             IJuany, am serving as a scribe to document services personally performed by Sarah Mccormack, DO based on my observation and the provider's statements to me. I, Sarah Mccormack, attest that Juany Mcguire is acting in a scribe capacity, has observed my performance of the services and has documented  them in accordance with my direction.    Sarah Mccormack DO  Emergency Medicine  [facility] EMERGENCY DEPARTMENT  [facility] [facility] [address] [phone]       Sarah Mccormack DO  06/27/24 0620

## 2024-06-27 NOTE — ED TRIAGE NOTES
Patient presents here for evaluation of right lower quadrant pain that he first notice early this morning. No history of any abdominal surgeries, including appendectomy. No fever, chills, diarrhea, nausea or vomiting. Pain is sharp in quality.      Triage Assessment (Adult)       Row Name 06/27/24 1118          Triage Assessment    Airway WDL WDL        Respiratory WDL    Respiratory WDL WDL        Skin Circulation/Temperature WDL    Skin Circulation/Temperature WDL WDL        Cardiac WDL    Cardiac WDL WDL        Peripheral/Neurovascular WDL    Peripheral Neurovascular WDL WDL        Cognitive/Neuro/Behavioral WDL    Cognitive/Neuro/Behavioral WDL WDL

## 2024-07-30 ENCOUNTER — TRANSCRIBE ORDERS (OUTPATIENT)
Dept: OTHER | Age: 64
End: 2024-07-30

## 2024-07-30 DIAGNOSIS — R05.3 CHRONIC COUGH: Primary | ICD-10-CM

## 2024-08-16 RX ORDER — ALBUTEROL SULFATE 0.83 MG/ML
2.5 SOLUTION RESPIRATORY (INHALATION) ONCE
Status: COMPLETED | OUTPATIENT
Start: 2024-08-19 | End: 2024-08-19

## 2024-08-19 ENCOUNTER — HOSPITAL ENCOUNTER (OUTPATIENT)
Dept: RESPIRATORY THERAPY | Facility: CLINIC | Age: 64
Discharge: HOME | End: 2024-08-19
Admitting: NURSE PRACTITIONER
Payer: COMMERCIAL

## 2024-08-19 DIAGNOSIS — R05.3 CHRONIC COUGH: Primary | ICD-10-CM

## 2024-08-19 LAB — HGB BLD-MCNC: 14.1 G/DL (ref 13.3–17.7)

## 2024-08-19 PROCEDURE — 94060 EVALUATION OF WHEEZING: CPT

## 2024-08-19 PROCEDURE — 36415 COLL VENOUS BLD VENIPUNCTURE: CPT

## 2024-08-19 PROCEDURE — 999N000157 HC STATISTIC RCP TIME EA 10 MIN

## 2024-08-19 PROCEDURE — 85018 HEMOGLOBIN: CPT

## 2024-08-19 PROCEDURE — 250N000009 HC RX 250

## 2024-08-19 PROCEDURE — 94726 PLETHYSMOGRAPHY LUNG VOLUMES: CPT

## 2024-08-19 PROCEDURE — 94729 DIFFUSING CAPACITY: CPT

## 2024-08-19 RX ADMIN — ALBUTEROL SULFATE 2.5 MG: 2.5 SOLUTION RESPIRATORY (INHALATION) at 13:59

## 2024-08-19 NOTE — PROGRESS NOTES
RESPIRATORY CARE NOTE    Complete PFT.   Good patient effort and cooperation.   The results of testing meet ATS critieria for acceptability & repeatability except FVC. Pt showing back extrapolation, but results are still repeatable.  Patient was given 2.5 mg albuterol via neb. Pt had increased coughing throughout test.  Patient left PFT lab in no distress.    Erica Mensah, RT  8/19/2024

## 2024-08-21 LAB
DLCOCOR-%PRED-PRE: 92 %
DLCOCOR-PRE: 25.06 ML/MIN/MMHG
DLCOUNC-%PRED-PRE: 91 %
DLCOUNC-PRE: 24.7 ML/MIN/MMHG
DLCOUNC-PRED: 26.95 ML/MIN/MMHG
ERV-%PRED-PRE: 30 %
ERV-PRE: 0.47 L
ERV-PRED: 1.53 L
EXPTIME-PRE: 6.99 SEC
FEF2575-%PRED-POST: 156 %
FEF2575-%PRED-PRE: 150 %
FEF2575-POST: 4.12 L/SEC
FEF2575-PRE: 3.97 L/SEC
FEF2575-PRED: 2.64 L/SEC
FEFMAX-%PRED-PRE: 80 %
FEFMAX-PRE: 7.19 L/SEC
FEFMAX-PRED: 8.94 L/SEC
FEV1-%PRED-PRE: 102 %
FEV1-PRE: 3.31 L
FEV1FEV6-PRE: 85 %
FEV1FEV6-PRED: 79 %
FEV1FVC-PRE: 82 %
FEV1FVC-PRED: 78 %
FEV1SVC-PRE: 81 %
FEV1SVC-PRED: 73 %
FIFMAX-PRE: 6.11 L/SEC
FRCPLETH-%PRED-PRE: 86 %
FRCPLETH-PRE: 3.15 L
FRCPLETH-PRED: 3.64 L
FVC-%PRED-PRE: 97 %
FVC-PRE: 4.03 L
FVC-PRED: 4.14 L
IC-%PRED-PRE: 110 %
IC-PRE: 3.39 L
IC-PRED: 3.07 L
RVPLETH-%PRED-PRE: 98 %
RVPLETH-PRE: 2.45 L
RVPLETH-PRED: 2.5 L
TLCPLETH-%PRED-PRE: 91 %
TLCPLETH-PRE: 6.54 L
TLCPLETH-PRED: 7.13 L
VA-%PRED-PRE: 93 %
VA-PRE: 6.07 L
VC-%PRED-PRE: 93 %
VC-PRE: 4.09 L
VC-PRED: 4.38 L

## 2024-10-24 ENCOUNTER — HOSPITAL ENCOUNTER (EMERGENCY)
Facility: HOSPITAL | Age: 64
Discharge: HOME | End: 2024-10-24
Attending: EMERGENCY MEDICINE | Admitting: EMERGENCY MEDICINE
Payer: COMMERCIAL

## 2024-10-24 ENCOUNTER — APPOINTMENT (OUTPATIENT)
Dept: CT IMAGING | Facility: HOSPITAL | Age: 64
End: 2024-10-24
Attending: STUDENT IN AN ORGANIZED HEALTH CARE EDUCATION/TRAINING PROGRAM
Payer: COMMERCIAL

## 2024-10-24 ENCOUNTER — APPOINTMENT (OUTPATIENT)
Dept: MRI IMAGING | Facility: HOSPITAL | Age: 64
End: 2024-10-24
Attending: NURSE PRACTITIONER
Payer: COMMERCIAL

## 2024-10-24 VITALS
BODY MASS INDEX: 39.43 KG/M2 | TEMPERATURE: 97.3 F | SYSTOLIC BLOOD PRESSURE: 185 MMHG | RESPIRATION RATE: 23 BRPM | HEIGHT: 70 IN | OXYGEN SATURATION: 96 % | DIASTOLIC BLOOD PRESSURE: 89 MMHG | HEART RATE: 59 BPM | WEIGHT: 275.4 LBS

## 2024-10-24 DIAGNOSIS — R03.0 ELEVATED BLOOD PRESSURE READING: ICD-10-CM

## 2024-10-24 DIAGNOSIS — R29.898 TRANSIENT LEG WEAKNESS: ICD-10-CM

## 2024-10-24 PROBLEM — N52.9 ERECTILE DYSFUNCTION: Status: ACTIVE | Noted: 2024-08-05

## 2024-10-24 PROBLEM — G47.33 OSA ON CPAP: Status: ACTIVE | Noted: 2024-10-24

## 2024-10-24 PROBLEM — K21.9 CHRONIC GERD: Status: ACTIVE | Noted: 2024-10-24

## 2024-10-24 LAB
ANION GAP SERPL CALCULATED.3IONS-SCNC: 10 MMOL/L (ref 7–15)
APTT PPP: 27 SECONDS (ref 22–38)
BASOPHILS # BLD AUTO: 0 10E3/UL (ref 0–0.2)
BASOPHILS NFR BLD AUTO: 0 %
BUN SERPL-MCNC: 20.4 MG/DL (ref 8–23)
CALCIUM SERPL-MCNC: 8.6 MG/DL (ref 8.8–10.4)
CHLORIDE SERPL-SCNC: 102 MMOL/L (ref 98–107)
CREAT SERPL-MCNC: 0.94 MG/DL (ref 0.67–1.17)
EGFRCR SERPLBLD CKD-EPI 2021: >90 ML/MIN/1.73M2
EOSINOPHIL # BLD AUTO: 0.1 10E3/UL (ref 0–0.7)
EOSINOPHIL NFR BLD AUTO: 2 %
ERYTHROCYTE [DISTWIDTH] IN BLOOD BY AUTOMATED COUNT: 13 % (ref 10–15)
GLUCOSE BLDC GLUCOMTR-MCNC: 95 MG/DL (ref 70–99)
GLUCOSE SERPL-MCNC: 99 MG/DL (ref 70–99)
HCO3 SERPL-SCNC: 26 MMOL/L (ref 22–29)
HCT VFR BLD AUTO: 39.6 % (ref 40–53)
HGB BLD-MCNC: 13 G/DL (ref 13.3–17.7)
HOLD SPECIMEN: NORMAL
IMM GRANULOCYTES # BLD: 0 10E3/UL
IMM GRANULOCYTES NFR BLD: 1 %
INR PPP: 1.07 (ref 0.85–1.15)
LYMPHOCYTES # BLD AUTO: 1.8 10E3/UL (ref 0.8–5.3)
LYMPHOCYTES NFR BLD AUTO: 28 %
MCH RBC QN AUTO: 28 PG (ref 26.5–33)
MCHC RBC AUTO-ENTMCNC: 32.8 G/DL (ref 31.5–36.5)
MCV RBC AUTO: 85 FL (ref 78–100)
MONOCYTES # BLD AUTO: 0.5 10E3/UL (ref 0–1.3)
MONOCYTES NFR BLD AUTO: 8 %
NEUTROPHILS # BLD AUTO: 4.1 10E3/UL (ref 1.6–8.3)
NEUTROPHILS NFR BLD AUTO: 62 %
NRBC # BLD AUTO: 0 10E3/UL
NRBC BLD AUTO-RTO: 0 /100
PLATELET # BLD AUTO: 263 10E3/UL (ref 150–450)
POTASSIUM SERPL-SCNC: 4 MMOL/L (ref 3.4–5.3)
RBC # BLD AUTO: 4.64 10E6/UL (ref 4.4–5.9)
SODIUM SERPL-SCNC: 138 MMOL/L (ref 135–145)
TROPONIN T SERPL HS-MCNC: 7 NG/L
WBC # BLD AUTO: 6.6 10E3/UL (ref 4–11)

## 2024-10-24 PROCEDURE — 85610 PROTHROMBIN TIME: CPT | Performed by: STUDENT IN AN ORGANIZED HEALTH CARE EDUCATION/TRAINING PROGRAM

## 2024-10-24 PROCEDURE — 85025 COMPLETE CBC W/AUTO DIFF WBC: CPT | Performed by: STUDENT IN AN ORGANIZED HEALTH CARE EDUCATION/TRAINING PROGRAM

## 2024-10-24 PROCEDURE — 36415 COLL VENOUS BLD VENIPUNCTURE: CPT | Performed by: STUDENT IN AN ORGANIZED HEALTH CARE EDUCATION/TRAINING PROGRAM

## 2024-10-24 PROCEDURE — 70553 MRI BRAIN STEM W/O & W/DYE: CPT

## 2024-10-24 PROCEDURE — 99285 EMERGENCY DEPT VISIT HI MDM: CPT | Mod: 25

## 2024-10-24 PROCEDURE — 250N000011 HC RX IP 250 OP 636: Performed by: STUDENT IN AN ORGANIZED HEALTH CARE EDUCATION/TRAINING PROGRAM

## 2024-10-24 PROCEDURE — 255N000002 HC RX 255 OP 636: Performed by: NURSE PRACTITIONER

## 2024-10-24 PROCEDURE — G0508 CRIT CARE TELEHEA CONSULT 60: HCPCS | Mod: G0 | Performed by: NURSE PRACTITIONER

## 2024-10-24 PROCEDURE — 84484 ASSAY OF TROPONIN QUANT: CPT | Performed by: STUDENT IN AN ORGANIZED HEALTH CARE EDUCATION/TRAINING PROGRAM

## 2024-10-24 PROCEDURE — 80048 BASIC METABOLIC PNL TOTAL CA: CPT | Performed by: STUDENT IN AN ORGANIZED HEALTH CARE EDUCATION/TRAINING PROGRAM

## 2024-10-24 PROCEDURE — 82962 GLUCOSE BLOOD TEST: CPT

## 2024-10-24 PROCEDURE — 70496 CT ANGIOGRAPHY HEAD: CPT

## 2024-10-24 PROCEDURE — A9585 GADOBUTROL INJECTION: HCPCS | Performed by: NURSE PRACTITIONER

## 2024-10-24 PROCEDURE — 70498 CT ANGIOGRAPHY NECK: CPT

## 2024-10-24 PROCEDURE — 85730 THROMBOPLASTIN TIME PARTIAL: CPT | Performed by: STUDENT IN AN ORGANIZED HEALTH CARE EDUCATION/TRAINING PROGRAM

## 2024-10-24 RX ORDER — ASPIRIN 81 MG/1
81 TABLET ORAL DAILY
Qty: 90 TABLET | Refills: 0 | Status: SHIPPED | OUTPATIENT
Start: 2024-10-24 | End: 2025-01-22

## 2024-10-24 RX ORDER — IOPAMIDOL 755 MG/ML
67 INJECTION, SOLUTION INTRAVASCULAR ONCE
Status: COMPLETED | OUTPATIENT
Start: 2024-10-24 | End: 2024-10-24

## 2024-10-24 RX ORDER — GADOBUTROL 604.72 MG/ML
13 INJECTION INTRAVENOUS ONCE
Status: COMPLETED | OUTPATIENT
Start: 2024-10-24 | End: 2024-10-24

## 2024-10-24 RX ADMIN — IOPAMIDOL 67 ML: 755 INJECTION, SOLUTION INTRAVENOUS at 12:03

## 2024-10-24 RX ADMIN — GADOBUTROL 13 ML: 604.72 INJECTION INTRAVENOUS at 14:31

## 2024-10-24 ASSESSMENT — ACTIVITIES OF DAILY LIVING (ADL)
ADLS_ACUITY_SCORE: 0

## 2024-10-24 NOTE — DISCHARGE INSTRUCTIONS
You were seen in the emergency department for transient weakness of your right leg.  Your evaluation included a reassuring CT scan and brain MRI.  specifically we did not see any signs of an ongoing or recent stroke.  A TIA, or a small stroke with resolved deficits, would be a possibility although seems less likely with a totally normal brain MRI.  We did discuss having his started on a baby aspirin at least until follow-up.  Your blood pressure was persistently elevated here in the emergency department and we would like you to follow-up as soon as possible with your primary doctor to get this rechecked and potentially start medication if persistently elevated.  We would need to reevaluate you right away in the emergency department if you do have any further acute neurological deficits like weakness numbness, facial droop, speech problems, etc.  Follow-up soon as possible with your clinic provider after this emergency department visit.

## 2024-10-24 NOTE — ED PROVIDER NOTES
EMERGENCY DEPARTMENT ENCOUNTER      NAME: Felton Leong  AGE: 63 year old male  YOB: 1960  MRN: [id number]  EVALUATION DATE & TIME: No admission date for patient encounter.    PCP: Abimael Vinson    ED PROVIDER: Rosendo Ha M.D.      Chief Complaint   Patient presents with    Numbness    Extremity Weakness         FINAL IMPRESSION:  1. Transient leg weakness    2. Elevated blood pressure reading          ED COURSE & MEDICAL DECISION MAKIN year old male presents to the Emergency Department for evaluation of leg weakness.  Patient presenting with acute right lower extremity weakness and numbness which seems to be improving initially.  5 strength with hip flexion, knee extension, plantarflexion and dorsiflexion on arrival but is noticeably a bit asymmetric when compared to the left side.  He is ambulatory but had a noticeable limp reported by nursing on arrival.  He denies any back pain.  He has an intact neurological exam otherwise with no focal weakness in his upper extremities, no facial droop, no other acute neurologic or stroke related complaints.  He was activated as a tier 1 stroke code given the acute onset of symptoms.  He was expedited to CT and I spoke with stroke neurology.  CT and CTA were completed and negative for CT evidence of stroke, hemorrhage, or large vessel occlusion.  Patient underwent additional lab testing which revealed sinus rhythm on EKG and telemetry as well as stable metabolic profile.  Stroke team evaluated the patient here, symptoms seem to be rapidly improving at that time so TNK was not recommended.  He was taken for follow-up brain MRI, unclear if his symptoms had completely resolved as he was getting this done.  Ultimately brain MRI was negative for evidence of acute or recent infarct which is reassuring.  Stroke team signed off at this point.  Patient remained moderately hypertensive and we did discuss this.  I have very low suspicion for any  kind of lumbar back pathology given absence of pain, resolved symptoms.  I do think TIA would remain on the differential although seems a bit less likely with a completely normal brain MRI.  I am going to have him started on an aspirin.  We discussed his elevated blood pressure and the need for follow-up on this for reevaluation.  We strictly reviewed return precautions for any new acute neurological symptoms or other immediate concern.    11:44 AM I met with the patient for the initial interview and physical examination. Discussed plan for treatment and workup in the ED.    11:47 AM I spoke with the stroke team.  12:08 PM I spoke with radiology regarding patient's CT results.  12:27 PM I spoke with the stroke team, again.    Medical Decision Making  Obtained supplemental history:Supplemental history obtained?: Documented in chart  Reviewed external records: External records reviewed?: No  Care impacted by chronic illness:Documented in Chart  Did you consider but not order tests?: Work up considered but not performed and documented in chart, if applicable  Did you interpret images independently?: Independent interpretation of ECG and images noted in documentation, when applicable.  Consultation discussion with other provider:Did you involve another provider (consultant, , pharmacy, etc.)?: I discussed the care with another health care provider, see documentation for details.  Discharge. No recommendations on prescription strength medication(s). N/A.    MIPS: Not Applicable          MEDICATIONS GIVEN IN THE EMERGENCY:  Medications   iopamidol (ISOVUE-370) solution 67 mL (67 mLs Intravenous $Given 10/24/24 1203)   gadobutrol (GADAVIST) injection 13 mL (13 mLs Intravenous $Given 10/24/24 1431)       NEW PRESCRIPTIONS STARTED AT TODAY'S ER VISIT  Discharge Medication List as of 10/24/2024  3:38 PM        START taking these medications    Details   aspirin 81 MG EC tablet Take 1 tablet (81 mg) by mouth daily., Disp-90  "tablet, R-0, Local Print                =================================================================    HPI    Patient information was obtained from: patient, triage RN    Use of : N/A         Felton Leong is a 63 year old male with no pertinent history who presents to this ED by walk-in for evaluation of unilateral numbness and weakness.    Patient reports that he developed numbness and weakness in his right leg while at work at 1030 this morning. He also endorses difficulty bending his right leg. Patient walks around and stands while at work. He was able to walk into the ED today. Patient has no symptoms in his arms.    Patient denies any history of stroke or seizure. He also denies back pain, use of blood thinners, and any other symptoms or complaints at this time.     Per triage nurse: patient seemed to \"hobble\" and have difficulty walking into the ED today.      REVIEW OF SYSTEMS   All systems reviewed and negative except as noted in HPI.    PAST MEDICAL HISTORY:  History reviewed. No pertinent past medical history.    PAST SURGICAL HISTORY:  History reviewed. No pertinent surgical history.        CURRENT MEDICATIONS:    No current facility-administered medications for this encounter.     Current Outpatient Medications   Medication Sig Dispense Refill    aspirin 81 MG EC tablet Take 1 tablet (81 mg) by mouth daily. 90 tablet 0         ALLERGIES:  No Known Allergies    FAMILY HISTORY:  No family history on file.    SOCIAL HISTORY:   Social History     Socioeconomic History    Marital status:        VITALS:  BP (!) 185/89   Pulse 59   Temp 97.3  F (36.3  C) (Temporal)   Resp 23   Ht 1.778 m (5' 10\")   Wt 124.9 kg (275 lb 6.4 oz)   SpO2 96%   BMI 39.52 kg/m      PHYSICAL EXAM    Constitutional: Well developed, Well nourished, NAD.  HENT: Normocephalic, Atraumatic. Neck Supple.  Eyes: EOMI, Conjunctiva normal.  Respiratory: Breathing comfortably on room air. Speaks full sentences " easily. Lungs clear to ascultation.  Cardiovascular: Normal heart rate, Regular rhythm. No peripheral edema.  Abdomen: Soft  Musculoskeletal: Good range of motion in all major joints. No major deformities noted.  Integument: Warm, Dry.  Neurologic: Awake, alert, oriented.  Face is symmetric.  Speech is normal.  cranial nerves II through XII intact.  Strength is 5 out of 5 to her bilateral upper extremities.  Strength is 5 out of 5 throughout left lower extremity.  Strength is 4+ out of 5 including hip flexion, knee extension, plantarflexion and dorsiflexion, there is no limb drift the patient is mildly asymmetrically weak in the right lower extremity.  Sensation to light touch is subjectively diminished over the dorsum of the left foot.  Intact throughout other extremities.  Psychiatric: Cooperative. Affect appropriate.    LAB:  All pertinent labs reviewed and interpreted.  Labs Ordered and Resulted from Time of ED Arrival to Time of ED Departure   BASIC METABOLIC PANEL - Abnormal       Result Value    Sodium 138      Potassium 4.0      Chloride 102      Carbon Dioxide (CO2) 26      Anion Gap 10      Urea Nitrogen 20.4      Creatinine 0.94      GFR Estimate >90      Calcium 8.6 (*)     Glucose 99     CBC WITH PLATELETS AND DIFFERENTIAL - Abnormal    WBC Count 6.6      RBC Count 4.64      Hemoglobin 13.0 (*)     Hematocrit 39.6 (*)     MCV 85      MCH 28.0      MCHC 32.8      RDW 13.0      Platelet Count 263      % Neutrophils 62      % Lymphocytes 28      % Monocytes 8      % Eosinophils 2      % Basophils 0      % Immature Granulocytes 1      NRBCs per 100 WBC 0      Absolute Neutrophils 4.1      Absolute Lymphocytes 1.8      Absolute Monocytes 0.5      Absolute Eosinophils 0.1      Absolute Basophils 0.0      Absolute Immature Granulocytes 0.0      Absolute NRBCs 0.0     INR - Normal    INR 1.07     PARTIAL THROMBOPLASTIN TIME - Normal    aPTT 27     TROPONIN T, HIGH SENSITIVITY - Normal    Troponin T, High  Sensitivity 7     GLUCOSE BY METER - Normal    GLUCOSE BY METER POCT 95     GLUCOSE MONITOR NURSING POCT       RADIOLOGY:  Reviewed all pertinent imaging. Please see official radiology report.  MR Brain w/o & w Contrast   Final Result   IMPRESSION:   1.  No acute or subacute ischemic change.   2.  No acute intracranial process or abnormal enhancement.      CTA Head Neck with Contrast   Final Result   IMPRESSION:    HEAD CT:   1.  No CT evidence for acute intracranial process.   2.  Brain atrophy and presumed chronic microvascular ischemic changes as above.      HEAD CTA:    1.  No evidence for high-grade stenosis or large vessel occlusion.      NECK CTA:   1.  Mild stenoses at the carotid bifurcations.      2.  Findings called to Dr. Ha on 10/24/2024 12:08 PM CDT           EKG:    Performed at: 12:17    Impression: Sinus bradycardia.    Rate: 56  Rhythm: Sinus  Axis: 2  DC Interval: 206  QRS Interval: 104  QTc Interval: 455  ST Changes: None  Comparison: When compared with ECG of 07-Sep-2022, No significant change was found.    I have independently reviewed and interpreted the EKG(s) documented above.      I, Ally Miramontes, am serving as a scribe to document services personally performed by Dr. Rosendo Ha, based on my observation and the provider's statements to me. I, Rosendo Ha MD attest that Ally Miramontes is acting in a scribe capacity, has observed my performance of the services and has documented them in accordance with my direction.    Rosendo Ha M.D.  Emergency Medicine  [facility] EMERGENCY DEPARTMENT  [address] [place] [id number]  [phone]  Dept: [phone]      Rosendo Ha MD  10/25/24 3398

## 2024-10-24 NOTE — PROGRESS NOTES
Interval Vascular Neurology Note    MRI brain is normal, no stroke or other pathology to explain patient's presenting symptoms. No further stroke work up recommended. Further work up per ED provider. Stroke service will sign off.     Ashley Ramos NP

## 2024-10-24 NOTE — ED TRIAGE NOTES
Pt brought in by wife. Today at 1030, pt developed numbness and weakness in right leg while working. Pt able to ambulate with difficulty. Denies any dizziness, trouble finding words, or slurred speech. Pt denies any back pain. Tier 1 Stroke Code called in triage.      Triage Assessment (Adult)       Row Name 10/24/24 1142          Triage Assessment    Airway WDL WDL        Respiratory WDL    Respiratory WDL WDL        Skin Circulation/Temperature WDL    Skin Circulation/Temperature WDL WDL        Cardiac WDL    Cardiac WDL WDL        Peripheral/Neurovascular WDL    Peripheral Neurovascular WDL neurovascular assessment lower        Cognitive/Neuro/Behavioral WDL    Cognitive/Neuro/Behavioral WDL WDL        RLE Neurovascular Assessment    Sensation RLE numbness present

## 2024-10-24 NOTE — CONSULTS
"[facility]     Stroke Code Note          History of Present Illness     Chief Complaint: Numbness and Extremity Weakness      Felton Leong is a 63 year old ambidextrous male who presented to the ED today with acute onset RLE numbness. Symptoms started at 1030. He has numbness from knee to hip on the right side and tingling in right foot. He reports right leg is ~ 60% of normal. He has not had anything like this before. No recent injuries or illness. He feels symptoms have improved a little since arrival to the ED. He is able to walk with a slight limp.          Past Medical History     Stroke risk factors: none known     Preadmission antithrombotic regimen: none     Modified Preethi Score (Pre-morbid)  0 - No symptoms.                   Assessment and Plan       1.  Acute RLE numbness (hip to knee) and right foot tingling, unclear etiology      Intravenous Thrombolysis  Not given due to:   - minor/isolated/quickly resolving symptoms     Endovascular Treatment  Not initiated due to absence of proximal vessel occlusion     Plan:  - MRI brain w/ and w/out contrast (ordered); further recommendations pending result      ___________________________________________________________________    Ashley Ramos NP  Vascular Neurology    To page me or covering stroke neurology team member, click here: [facility]  Choose \"On Call\" tab at top, then select \"NEUROLOGY/ALL SITES\" from middle drop-down box, press Enter, then look for \"stroke\" or \"telestroke\" for your site.  ___________________________________________________________________        Imaging/Labs   (personally reviewed CT, CTA)    CT head: no acute pathology   CTA head/neck: no high grade stenosis, dissection, or LVO; mild bilateral carotid bifurcation stenosis          Physical Examination     BP: (!) 164/79   Pulse: 64   Resp: 20   Temp: 97.3  F (36.3  C)   Temp src: Temporal   SpO2: 94 %   O2 Device: None (Room air)   Weight: 129.3 kg (285 " "lb)    Wt Readings from Last 2 Encounters:   10/24/24 129.3 kg (285 lb)   06/27/24 126.6 kg (279 lb)       Neuro Exam  Mental Status:  alert, oriented x 3, follows commands, speech clear and fluent, naming and repetition normal  Cranial Nerves:  visual fields intact (tested by nurse), EOMI with normal smooth pursuit, facial sensation intact and symmetric (tested by nurse), facial movements symmetric, hearing not formally tested but intact to conversation, no dysarthria, shoulder shrug equal bilaterally, tongue protrusion midline  Motor:  no abnormal movements, able to move all limbs antigravity spontaneously, initially had some subtle drift in BUEs but that wasn't present on second exam, mild drift in RLE   Reflexes:  unable to test (telestroke)  Sensory:  numbness in RLE from hip to knee and tingling in right foot \"like it's asleep\" (assessed by nurse), no extinction on double simultaneous stimulation (assessed by nurse)  Coordination:  normal finger-to-nose and heel-to-shin bilaterally without dysmetria  Station/Gait:   RLE limp, able to walk unassisted         Stroke Scales       NIHSS  1a. Level of Consciousness 0-->Alert, keenly responsive   1b. LOC Questions 0-->Answers both questions correctly   1c. LOC Commands 0-->Performs both tasks correctly   2.   Best Gaze 0-->Normal   3.   Visual 0-->No visual loss   4.   Facial Palsy 0-->Normal symmetrical movements   5a. Motor Arm, Left 1-->Drift, limb holds 90 (or 45) degrees, but drifts down before full 10 seconds, does not hit bed or other support   5b. Motor Arm, Right 1-->Drift, limb holds 90 (or 45) degrees, but drifts down before full 10 secs, does not hit bed or other support   6a. Motor Leg, Left 0-->No drift, leg holds 30 degree position for full 5 secs   6b. Motor Leg, right 1-->Drift, leg falls by the end of the 5-sec period but does not hit bed   7.   Limb Ataxia 0-->Absent   8.   Sensory 1-->Mild-to-moderate sensory loss, patient feels pinprick is less " "sharp or is dull on the affected side, or there is a loss of superficial pain with pinprick, but patient is aware of being touched   9.   Best Language 0-->No aphasia, normal   10. Dysarthria 0-->Normal   11. Extinction and Inattention  0-->No abnormality   Total 4 (10/24/24 1205)            Labs     CBC  Lab Results   Component Value Date    HGB 14.1 08/19/2024    HCT 41.3 06/27/2024    WBC 6.9 06/27/2024     06/27/2024       BMP  Lab Results   Component Value Date     06/27/2024    POTASSIUM 4.2 06/27/2024    CHLORIDE 104 06/27/2024    CO2 25 06/27/2024    BUN 21.2 06/27/2024    CR 0.94 06/27/2024    GLC 94 06/27/2024    CHINTAN 8.5 (L) 06/27/2024       INR  No results found for: \"INR\"    Data   Stroke Code Data  (for stroke code with tele)  Stroke code activated 10/24/24  1143   First stroke provider response 10/24/24  1145   Video start time 10/24/24  1157   Video end time 10/24/24  1226   Last known normal 10/24/24  1030   Time of discovery (or onset of symptoms)  10/24/24  1030   Head CT read by Stroke Neuro Provider 10/24/24  1204   Was stroke code de-escalated? Yes  10/24/24  1228     Telestroke Service Details  Type of service telemedicine diagnostic assessment of acute neurological changes   Reason telemedicine is appropriate patient requires assessment with a specialist for diagnosis and treatment of neurological symptoms   Mode of transmission secure interactive audio and video communication per Debora   Originating site (patient location) [facility]    Distant site (provider location) [facility]        Clinically Significant Risk Factors Present on Admission                           # Severe Obesity: Estimated body mass index is 40.89 kg/m  as calculated from the following:    Height as of this encounter: 1.778 m (5' 10\").    Weight as of this encounter: 129.3 kg (285 lb).             Time Spent on this Encounter   Billing: I " personally examined and evaluated the patient today. At the time of my evaluation and management the patient was critical condition today due to acute neurological symptoms. I personally managed review of labs and images, neuro exam. Key decisions made today included MRI. I spent a total of 50 minutes providing critical care services, evaluating the patient, directing care and reviewing laboratory values and radiologic reports.

## 2024-10-25 LAB
ATRIAL RATE - MUSE: 56 BPM
DIASTOLIC BLOOD PRESSURE - MUSE: NORMAL MMHG
INTERPRETATION ECG - MUSE: NORMAL
P AXIS - MUSE: 31 DEGREES
PR INTERVAL - MUSE: 206 MS
QRS DURATION - MUSE: 104 MS
QT - MUSE: 472 MS
QTC - MUSE: 455 MS
R AXIS - MUSE: 2 DEGREES
SYSTOLIC BLOOD PRESSURE - MUSE: NORMAL MMHG
T AXIS - MUSE: 30 DEGREES
VENTRICULAR RATE- MUSE: 56 BPM

## 2025-02-24 ENCOUNTER — LAB REQUISITION (OUTPATIENT)
Dept: LAB | Facility: CLINIC | Age: 65
End: 2025-02-24
Payer: COMMERCIAL

## 2025-02-24 DIAGNOSIS — Z01.818 ENCOUNTER FOR OTHER PREPROCEDURAL EXAMINATION: ICD-10-CM

## 2025-02-24 PROCEDURE — 80048 BASIC METABOLIC PNL TOTAL CA: CPT | Mod: ORL | Performed by: PHYSICIAN ASSISTANT

## 2025-02-25 LAB
ANION GAP SERPL CALCULATED.3IONS-SCNC: 9 MMOL/L (ref 7–15)
BUN SERPL-MCNC: 20.8 MG/DL (ref 8–23)
CALCIUM SERPL-MCNC: 9.7 MG/DL (ref 8.8–10.4)
CHLORIDE SERPL-SCNC: 102 MMOL/L (ref 98–107)
CREAT SERPL-MCNC: 1.16 MG/DL (ref 0.67–1.17)
EGFRCR SERPLBLD CKD-EPI 2021: 70 ML/MIN/1.73M2
GLUCOSE SERPL-MCNC: 103 MG/DL (ref 70–99)
HCO3 SERPL-SCNC: 31 MMOL/L (ref 22–29)
POTASSIUM SERPL-SCNC: 4.3 MMOL/L (ref 3.4–5.3)
SODIUM SERPL-SCNC: 142 MMOL/L (ref 135–145)

## 2025-03-17 ENCOUNTER — LAB REQUISITION (OUTPATIENT)
Dept: LAB | Facility: CLINIC | Age: 65
End: 2025-03-17
Payer: COMMERCIAL

## 2025-03-17 DIAGNOSIS — Z12.5 ENCOUNTER FOR SCREENING FOR MALIGNANT NEOPLASM OF PROSTATE: ICD-10-CM

## 2025-03-17 PROCEDURE — G0103 PSA SCREENING: HCPCS | Mod: ORL | Performed by: FAMILY MEDICINE

## 2025-03-17 PROCEDURE — G0103 PSA SCREENING: HCPCS | Performed by: FAMILY MEDICINE

## 2025-03-18 ENCOUNTER — LAB REQUISITION (OUTPATIENT)
Dept: LAB | Facility: CLINIC | Age: 65
End: 2025-03-18
Payer: COMMERCIAL

## 2025-03-18 DIAGNOSIS — Z12.5 ENCOUNTER FOR SCREENING FOR MALIGNANT NEOPLASM OF PROSTATE: ICD-10-CM

## 2025-03-18 LAB — PSA SERPL DL<=0.01 NG/ML-MCNC: 1.17 NG/ML (ref 0–4.5)
